# Patient Record
Sex: FEMALE | Race: BLACK OR AFRICAN AMERICAN | Employment: STUDENT | URBAN - METROPOLITAN AREA
[De-identification: names, ages, dates, MRNs, and addresses within clinical notes are randomized per-mention and may not be internally consistent; named-entity substitution may affect disease eponyms.]

---

## 2017-04-30 ENCOUNTER — HOSPITAL ENCOUNTER (EMERGENCY)
Age: 22
Discharge: HOME | End: 2017-05-01
Attending: EMERGENCY MEDICINE
Payer: SELF-PAY

## 2017-04-30 VITALS
RESPIRATION RATE: 16 BRPM | DIASTOLIC BLOOD PRESSURE: 78 MMHG | TEMPERATURE: 98.7 F | SYSTOLIC BLOOD PRESSURE: 117 MMHG | BODY MASS INDEX: 30.11 KG/M2 | OXYGEN SATURATION: 99 % | HEART RATE: 85 BPM | WEIGHT: 170 LBS

## 2017-04-30 DIAGNOSIS — R11.0 NAUSEA WITHOUT VOMITING: ICD-10-CM

## 2017-04-30 DIAGNOSIS — R51.9 ACUTE NONINTRACTABLE HEADACHE, UNSPECIFIED HEADACHE TYPE: Primary | ICD-10-CM

## 2017-04-30 LAB
APPEARANCE UR: CLEAR
BILIRUB UR QL: NEGATIVE
COLOR UR: YELLOW
GLUCOSE UR STRIP.AUTO-MCNC: NEGATIVE MG/DL
HCG UR QL: NEGATIVE
HGB UR QL STRIP: NEGATIVE
KETONES UR QL STRIP.AUTO: 15 MG/DL
LEUKOCYTE ESTERASE UR QL STRIP.AUTO: NEGATIVE
NITRITE UR QL STRIP.AUTO: NEGATIVE
PH UR STRIP: 6.5 [PH] (ref 5–8)
PROT UR STRIP-MCNC: NEGATIVE MG/DL
SP GR UR REFRACTOMETRY: >1.03 (ref 1–1.03)
UROBILINOGEN UR QL STRIP.AUTO: 1 EU/DL (ref 0.2–1)

## 2017-04-30 PROCEDURE — 99283 EMERGENCY DEPT VISIT LOW MDM: CPT

## 2017-04-30 PROCEDURE — 81025 URINE PREGNANCY TEST: CPT | Performed by: EMERGENCY MEDICINE

## 2017-04-30 PROCEDURE — 74011250637 HC RX REV CODE- 250/637: Performed by: EMERGENCY MEDICINE

## 2017-04-30 PROCEDURE — 81003 URINALYSIS AUTO W/O SCOPE: CPT | Performed by: EMERGENCY MEDICINE

## 2017-04-30 RX ORDER — ONDANSETRON 4 MG/1
4 TABLET, FILM COATED ORAL
Status: COMPLETED | OUTPATIENT
Start: 2017-04-30 | End: 2017-04-30

## 2017-04-30 RX ORDER — BUTALBITAL, ACETAMINOPHEN AND CAFFEINE 300; 40; 50 MG/1; MG/1; MG/1
1 CAPSULE ORAL
Status: COMPLETED | OUTPATIENT
Start: 2017-04-30 | End: 2017-04-30

## 2017-04-30 RX ADMIN — ONDANSETRON 4 MG: 4 TABLET, FILM COATED ORAL at 23:43

## 2017-04-30 RX ADMIN — BUTALBITAL, ACETAMINOPHEN AND CAFFEINE 1 CAPSULE: 300; 40; 50 CAPSULE ORAL at 23:43

## 2017-05-01 RX ORDER — ONDANSETRON 4 MG/1
4 TABLET, FILM COATED ORAL
Qty: 12 TAB | Refills: 0 | Status: SHIPPED | OUTPATIENT
Start: 2017-05-01

## 2017-05-01 RX ORDER — BUTALBITAL, ACETAMINOPHEN AND CAFFEINE 300; 40; 50 MG/1; MG/1; MG/1
1 CAPSULE ORAL
Qty: 10 CAP | Refills: 0 | Status: SHIPPED | OUTPATIENT
Start: 2017-05-01

## 2017-05-01 NOTE — DISCHARGE INSTRUCTIONS

## 2017-05-01 NOTE — ED PROVIDER NOTES
HPI Comments: 11:20 PM Enrique May is a 23 y.o. female with a past hx of HA who presents to the ED via POV c/o intermittent HA that began 1 week ago. Pt describe Anna Lemus as Timmy Hint" and states that she feels better when she lays down. Pt reports associated symptom of nausea. Pt states she was on medication for chronic migraines a year ago but has not had a migraines for the past year. Pt was used to take Propranolol daily but discontinued since she was not having HA. Pt took Propranolol over the past week but says it did not help with symptoms. PT denies visual disturbances,vomiting, photophobia, numbness, or tingling. Pt denies exacerbating factors. Patient is a 23 y.o. female presenting with nausea. The history is provided by the patient. Nausea    Associated symptoms include headaches and headaches. Pertinent negatives include no fever and no abdominal pain. Past Medical History:   Diagnosis Date    Headache        Past Surgical History:   Procedure Laterality Date    HX HEENT Bilateral 2014    tonsilectomy         Family History:   Problem Relation Age of Onset    Diabetes Mother     Hypertension Mother     Hypertension Paternal Grandmother     No Known Problems Father     No Known Problems Sister     No Known Problems Brother     No Known Problems Brother     No Known Problems Sister     No Known Problems Sister     No Known Problems Sister     No Known Problems Sister     No Known Problems Sister     No Known Problems Sister     No Known Problems Sister     No Known Problems Sister        Social History     Social History    Marital status: SINGLE     Spouse name: N/A    Number of children: N/A    Years of education: N/A     Occupational History    Not on file.      Social History Main Topics    Smoking status: Never Smoker    Smokeless tobacco: Never Used    Alcohol use No    Drug use: No    Sexual activity: Yes     Partners: Male     Birth control/ protection: Condom Other Topics Concern    Not on file     Social History Narrative    No narrative on file         ALLERGIES: Review of patient's allergies indicates no known allergies. Review of Systems   Constitutional: Negative for fever. Eyes: Negative for photophobia and visual disturbance. Gastrointestinal: Positive for nausea. Negative for abdominal pain and vomiting. Musculoskeletal: Negative for neck pain. Neurological: Positive for headaches. All other systems reviewed and are negative. Vitals:    04/30/17 2155 04/30/17 2344   BP: 125/82 117/78   Pulse: 89 85   Resp: 18 16   Temp: 98.7 °F (37.1 °C)    SpO2: 100% 99%   Weight: 77.1 kg (170 lb)             Physical Exam   Constitutional: She is oriented to person, place, and time. She appears well-developed and well-nourished. HENT:   Head: Normocephalic and atraumatic. Eyes: EOM are normal.   Neck: Normal range of motion. Neck supple. No JVD present. Musculoskeletal: She exhibits no edema. Strength 5/5 x4 extremities  Heel to shin intact BL  Gross sensation intact x4 extremities  Negative pronator drift   Neurological: She is alert and oriented to person, place, and time. No cranial nerve deficit. Coordination normal.   Skin: Skin is warm and dry. No erythema. MDM  Number of Diagnoses or Management Options  Acute nonintractable headache, unspecified headache type:   Nausea without vomiting:   Diagnosis management comments: 24 y/o female presents with HA, normal neuro exam, no indication for imaging  No sxs of meningitis  Will give po meds, no indication for labs. ED Course       Procedures    Vitals:  Patient Vitals for the past 12 hrs:   Temp Pulse Resp BP SpO2   04/30/17 2344 - 85 16 117/78 99 %   04/30/17 2155 98.7 °F (37.1 °C) 89 18 125/82 100 %     100% on RA, indicating adequate oxygenation.      Medications ordered:   Medications   butalbital-acetaminophen-caff (FIORICET) per capsule 1 Cap (1 Cap Oral Given 4/30/17 9864) ondansetron hcl (ZOFRAN) tablet 4 mg (4 mg Oral Given 4/30/17 2953)         Lab findings:  Recent Results (from the past 12 hour(s))   URINALYSIS W/ RFLX MICROSCOPIC    Collection Time: 04/30/17 10:35 PM   Result Value Ref Range    Color YELLOW      Appearance CLEAR      Specific gravity >1.030 (H) 1.005 - 1.030    pH (UA) 6.5 5.0 - 8.0      Protein NEGATIVE  NEG mg/dL    Glucose NEGATIVE  NEG mg/dL    Ketone 15 (A) NEG mg/dL    Bilirubin NEGATIVE  NEG      Blood NEGATIVE  NEG      Urobilinogen 1.0 0.2 - 1.0 EU/dL    Nitrites NEGATIVE  NEG      Leukocyte Esterase NEGATIVE  NEG     HCG URINE, QL    Collection Time: 04/30/17 10:35 PM   Result Value Ref Range    HCG urine, Ql. NEGATIVE  NEG         Progress notes, Consult notes or additional Procedure notes:   12:52 AM I have rechecked the patient and the the patient states that her HA has resolved and that she has mild nausea. She has requested to go home. I've given the patient precautions to return to the emergency room if there are any new or worsening conditions. I've also instructed the patient to follow up regarding their visit today. Patient has verbalized understanding and agreement with treatment plan, plan to discharge, and follow-up. All questions were answered at this time. Disposition:  Diagnosis:   1. Acute nonintractable headache, unspecified headache type    2. Nausea without vomiting        Disposition: discharge     Follow-up Information     None            Patient's Medications   Start Taking    BUTALBITAL-ACETAMINOPHEN-CAFF (FIORICET) -40 MG PER CAPSULE    Take 1 Cap by mouth every four (4) hours as needed for Pain. Max Daily Amount: 6 Caps. ONDANSETRON HCL (ZOFRAN, AS HYDROCHLORIDE,) 4 MG TABLET    Take 1 Tab by mouth every eight (8) hours as needed for Nausea.    Continue Taking    LORATADINE (CLARITIN) 10 MG TABLET    TAKE 1 TABLET BY MOUTH EVERY DAY    PROPRANOLOL (INDERAL) 40 MG TABLET    Take 1 Tab by mouth two (2) times a day.   These Medications have changed    No medications on file   Stop Taking    No medications on file     Scribe Attestation:     I, Christopher Durham, scribing for and in the presence of  [address], DO May 01, 2017 at 12:52 AM     Physician Attestation:   I personally performed the services described in this documentation, reviewed and edited the documentation which was dictated to the scribe in my presence, and it accurately records my words and actions.  Gilma Shultz DO  May 01, 2017     Signed by: Cecy Covington, 05/01/17, 11:36 PM

## 2017-05-01 NOTE — ED NOTES
I have reviewed discharge instructions with the patient. The patient verbalized understanding. Pt ambulated without difficulty   Pt awake and alert, color pink, resp even and relaxed.    Arm band cut off and disposed of.    Prescriptions given to pt and understands  Pt states that her headache has improved at this time 4/10 frontal headache